# Patient Record
Sex: MALE | Race: WHITE
[De-identification: names, ages, dates, MRNs, and addresses within clinical notes are randomized per-mention and may not be internally consistent; named-entity substitution may affect disease eponyms.]

---

## 2019-02-25 ENCOUNTER — HOSPITAL ENCOUNTER (EMERGENCY)
Dept: HOSPITAL 63 - ER | Age: 49
Discharge: HOME | End: 2019-02-25
Payer: COMMERCIAL

## 2019-02-25 VITALS — DIASTOLIC BLOOD PRESSURE: 88 MMHG | SYSTOLIC BLOOD PRESSURE: 149 MMHG

## 2019-02-25 VITALS — BODY MASS INDEX: 33.23 KG/M2 | HEIGHT: 70 IN | WEIGHT: 232.15 LBS

## 2019-02-25 DIAGNOSIS — R07.89: Primary | ICD-10-CM

## 2019-02-25 DIAGNOSIS — K22.4: ICD-10-CM

## 2019-02-25 DIAGNOSIS — R11.11: ICD-10-CM

## 2019-02-25 LAB
% BANDS: 4 % (ref 0–9)
% LYMPHS: 12 % (ref 24–48)
% MONOS: 3 % (ref 0–10)
% SEGS: 80 % (ref 35–66)
ALBUMIN SERPL-MCNC: 4.2 G/DL (ref 3.4–5)
ALBUMIN/GLOB SERPL: 1.1 {RATIO} (ref 1–1.7)
ALP SERPL-CCNC: 63 U/L (ref 46–116)
ALT SERPL-CCNC: 41 U/L (ref 16–63)
ANION GAP SERPL CALC-SCNC: 11 MMOL/L (ref 6–14)
APTT PPP: YELLOW S
AST SERPL-CCNC: 24 U/L (ref 15–37)
BACTERIA #/AREA URNS HPF: 0 /HPF
BASOPHILS # BLD AUTO: 0.1 X10^3/UL (ref 0–0.2)
BASOPHILS NFR BLD AUTO: 0 % (ref 0–3)
BASOPHILS NFR BLD: 0 % (ref 0–3)
BILIRUB SERPL-MCNC: 1.1 MG/DL (ref 0.2–1)
BILIRUB UR QL STRIP: (no result)
BUN/CREAT SERPL: 18 (ref 6–20)
CA-I SERPL ISE-MCNC: 18 MG/DL (ref 8–26)
CALCIUM SERPL-MCNC: 9.3 MG/DL (ref 8.5–10.1)
CHLORIDE SERPL-SCNC: 102 MMOL/L (ref 98–107)
CO2 SERPL-SCNC: 28 MMOL/L (ref 21–32)
CREAT SERPL-MCNC: 1 MG/DL (ref 0.7–1.3)
EOSINOPHIL NFR BLD AUTO: 1 % (ref 0–5)
EOSINOPHIL NFR BLD: 0 % (ref 0–3)
EOSINOPHIL NFR BLD: 0.1 X10^3/UL (ref 0–0.7)
ERYTHROCYTE [DISTWIDTH] IN BLOOD BY AUTOMATED COUNT: 12.6 % (ref 11.5–14.5)
FIBRINOGEN PPP-MCNC: CLEAR MG/DL
GFR SERPLBLD BASED ON 1.73 SQ M-ARVRAT: 79.1 ML/MIN
GLOBULIN SER-MCNC: 3.7 G/DL (ref 2.2–3.8)
GLUCOSE SERPL-MCNC: 107 MG/DL (ref 70–99)
GLUCOSE UR STRIP-MCNC: (no result) MG/DL
HCT VFR BLD CALC: 46.4 % (ref 39–53)
HGB BLD-MCNC: 16.1 G/DL (ref 13–17.5)
LYMPHOCYTES # BLD: 1.3 X10^3/UL (ref 1–4.8)
LYMPHOCYTES NFR BLD AUTO: 6 % (ref 24–48)
MCH RBC QN AUTO: 31 PG (ref 25–35)
MCHC RBC AUTO-ENTMCNC: 35 G/DL (ref 31–37)
MCV RBC AUTO: 90 FL (ref 79–100)
MONO #: 0.8 X10^3/UL (ref 0–1.1)
MONOCYTES NFR BLD: 4 % (ref 0–9)
NEUT #: 18.3 X10^3UL (ref 1.8–7.7)
NEUTROPHILS NFR BLD AUTO: 89 % (ref 31–73)
NITRITE UR QL STRIP: (no result)
PLATELET # BLD AUTO: 274 X10^3/UL (ref 140–400)
PLATELET # BLD EST: ADEQUATE 10*3/UL
POTASSIUM SERPL-SCNC: 4 MMOL/L (ref 3.5–5.1)
PROT SERPL-MCNC: 7.9 G/DL (ref 6.4–8.2)
RBC # BLD AUTO: 5.18 X10^6/UL (ref 4.3–5.7)
RBC #/AREA URNS HPF: 0 /HPF (ref 0–2)
SODIUM SERPL-SCNC: 141 MMOL/L (ref 136–145)
SP GR UR STRIP: 1.02
SQUAMOUS #/AREA URNS LPF: (no result) /LPF
UROBILINOGEN UR-MCNC: 0.2 MG/DL
WBC # BLD AUTO: 20.6 X10^3/UL (ref 4–11)
WBC #/AREA URNS HPF: 0 /HPF (ref 0–4)

## 2019-02-25 PROCEDURE — 80053 COMPREHEN METABOLIC PANEL: CPT

## 2019-02-25 PROCEDURE — 85007 BL SMEAR W/DIFF WBC COUNT: CPT

## 2019-02-25 PROCEDURE — 36415 COLL VENOUS BLD VENIPUNCTURE: CPT

## 2019-02-25 PROCEDURE — 85025 COMPLETE CBC W/AUTO DIFF WBC: CPT

## 2019-02-25 PROCEDURE — 81001 URINALYSIS AUTO W/SCOPE: CPT

## 2019-02-25 PROCEDURE — 84484 ASSAY OF TROPONIN QUANT: CPT

## 2019-02-25 PROCEDURE — 71045 X-RAY EXAM CHEST 1 VIEW: CPT

## 2019-02-25 PROCEDURE — 96360 HYDRATION IV INFUSION INIT: CPT

## 2019-02-25 PROCEDURE — 93005 ELECTROCARDIOGRAM TRACING: CPT

## 2019-02-25 NOTE — PHYS DOC
Adult General


Chief Complaint


Chief Complaint:  CHEST PAIN





HPI


HPI


49-year-old male presents with chest pain. The patient was eating lunch earlier 

today when he felt like he got a piece of food stuck. He had an episode of 

vomiting. Since that time he's had some right sided chest pain. Patient thought 

it might just be gas so he took some Gas-X. He decided to come the emergency 

room when he additionally developed cramping between his shoulder blades and 

the pain was as high as 9 out of 10. At this time the pain is resolved, but it 

seems to come and go. He has no heart history. He denies shortness of breath or 

diaphoresis. He has not had an EGD to evaluate his swallowing issues. He is not 

on any medications. He denies fever or chills.





Review of Systems


Review of Systems





Constitutional: Denies fever or chills []


Eyes: Denies change in visual acuity, redness, or eye pain []


HENT: Denies nasal congestion or sore throat []


Respiratory: Denies cough or shortness of breath []


Cardiovascular: No additional information not addressed in HPI []


GI: Denies abdominal pain, nausea, vomiting, bloody stools or diarrhea []


: Denies dysuria or hematuria []


Musculoskeletal: Pain between the shoulder blades.[]


Integument: Denies rash or skin lesions []


Neurologic: Denies headache, focal weakness or sensory changes []


Endocrine: Denies polyuria or polydipsia []





All other systems were reviewed and found to be within normal limits, except as 

documented in this note.





Current Medications


Current Medications





Current Medications








 Medications


  (Trade)  Dose


 Ordered  Sig/[facility]  Start Time


 Stop Time Status Last Admin


Dose Admin


 


 Aspirin


  ([facility]


 Aspirin)  324 mg  1X  ONCE  2/25/19 15:15


 2/25/19 15:16 UNV  














Physical Exam


Physical Exam





Constitutional: Well developed, well nourished, no acute distress, non-toxic 

appearance. []


HENT: Normocephalic, atraumatic, bilateral external ears normal, oropharynx 

moist, no oral exudates, nose normal. []


Eyes: PERRLA, EOMI, conjunctiva normal, no discharge. [] 


Neck: Normal range of motion, no tenderness, supple, no stridor. [] 


Cardiovascular:Heart rate regular rhythm, no murmur []


Lungs & Thorax:  Bilateral breath sounds clear to auscultation []


Abdomen: Bowel sounds normal, soft, no tenderness, no masses, no pulsatile 

masses. [] 


Skin: Warm, dry, no erythema, no rash. [] 


Back: No tenderness, no CVA tenderness. [] 


Extremities: No tenderness, no cyanosis, no clubbing, ROM intact, no edema. [] 


Neurologic: Alert and oriented X 3, normal motor function, normal sensory 

function, no focal deficits noted. []


Psychologic: Affect normal, judgement normal, mood anxious. []





EKG


EKG


Sinus rhythm, rate 86, normal axis, no ST elevations or depressions.[]





Radiology/Procedures


Radiology/Procedures


[]


Impressions:


EXAM: Chest, single view.


 


HISTORY: Chest pain.


 


COMPARISON: None.


 


FINDINGS: A frontal view of the chest is obtained. There is no infiltrate,


pleural effusion or pneumothorax. There are slight decreased lung volumes 


with associated atelectasis and vascular crowding. The cardiac silhouette 


is normal in size for lung volumes.


 


IMPRESSION: No acute pulmonary finding.


 


Electronically signed by: Jazlyn Boswell MD (2/25/2019 3:16 PM) [place] [id number]














DICTATED AND SIGNED BY:     JAZLYN BOSWELL MD


DATE:     02/25/19 1242





CC: ROBERT MONCADA DO; PCP,NO





Course & Med Decision Making


Course & Med Decision Making


Pertinent Labs and Imaging studies reviewed. (See chart for details)


The patient's HEART score is 1 for age.  The patient's labs are unremarkable 

except for an elevated white count. His troponin is negative. His EKG is 

unremarkable. His chest x-ray is unremarkable. I believe the patient may be 

having esophageal spasm. Partially stuck fluid bolus. I will try a GI cocktail 

to see if this improves his pain. Patient's pain has improved significant 

family. He is having occasional tightening in the epigastric area, but is much 

less severe. It sounds like the patient is having esophageal spasm likely from 

irritation with the food getting caught. I have advised the patient to seek an 

outpatient EGD since he has had long-standing issues with swallowing. Do not 

believe his pain is cardiac in origin. I have offered the patient observation 

admission versus going home. He has elected to go home. I stressed to him that 

if his condition worsens or if the symptoms return that he should come back to 

the emergency room. He is stated verbal agreement. I cannot explain his 

elevated white count. The patient did have a single shot 2 days ago and is 

possibly an immune reaction.  I do not have any signs of infection. The patient 

is stable for discharge at this time.


[]





Dragon Disclaimer


Dragon Disclaimer


This electronic medical record was generated, in whole or in part, using a 

voice recognition dictation system.





Departure


Departure:


Impression:  


 Primary Impression:  


 Chest pain


 Additional Impression:  


 Esophageal spasm


Disposition:  01 HOME, SELF-CARE


Condition:  STABLE


Referrals:  


PCP,NO (PCP)


Patient Instructions:  Chest Pain (Nonspecific), Easy-to-Read, Esophageal Spasm





Problem Qualifiers








 Primary Impression:  


 Chest pain


 Chest pain type:  other chest pain  Qualified Codes:  R07.89 - Other chest pain








ROBERT MONCADA DO Feb 25, 2019 15:33

## 2019-02-25 NOTE — RAD
EXAM: Chest, single view.

 

HISTORY: Chest pain.

 

COMPARISON: None.

 

FINDINGS: A frontal view of the chest is obtained. There is no infiltrate,

pleural effusion or pneumothorax. There are slight decreased lung volumes 

with associated atelectasis and vascular crowding. The cardiac silhouette 

is normal in size for lung volumes.

 

IMPRESSION: No acute pulmonary finding.

 

Electronically signed by: Jazlyn Boswell MD (2/25/2019 3:16 PM) [place] [id number]

## 2019-02-26 NOTE — EKG
[facility] [address], [place] [address]

Test Date:    2019               Test Time:    15:10:53

Pat Name:     MILLY ALMONTE              Department:   

Patient ID:   [id number]           Room:          

Gender:       M                        Technician:   

:          [id number]               Requested By: ROBERT MONCADA

Order Number: [id number]            Reading MD:   Velasquez Baca

                                 Measurements

Intervals                              Axis          

Rate:         86                       P:            42

CT:           182                      QRS:          48

QRSD:         74                       T:            23

QT:           328                                    

QTc:          395                                    

                           Interpretive Statements

SINUS RHYTHM

NORMAL ECG

RI6.01

No previous ECG available for comparison



Electronically Signed On 3-5-2019 10:47:18 CST by Velasquez Baca